# Patient Record
(demographics unavailable — no encounter records)

---
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
## 2024-10-14 NOTE — PHYSICAL EXAM
[1st] : 1st [Distal Phalanx] : distal phalanx [NL (75)] : Dorsiflexion 75 degrees [NL (85)] : volarflexion 85 degrees [5___] : volarflexion 5[unfilled]/5 [] : light touch intact throughout [Right] : right fingers [Fracture] : Fracture [FreeTextEntry9] : mojgan fx distal phalanx

## 2024-10-14 NOTE — ASSESSMENT
[FreeTextEntry1] : Xrays reviewed with patient and patient's mother Treatment options discussed  Finger splint applied today - medically necessary for stability otc anti-inflammatories / tylenol as needed Ice No phys ed / sports Follow up with hand specialist in 1 week

## 2024-10-21 NOTE — IMAGING
[de-identified] : right thumb with mild ttp over the distal phalanx no deformity all digits are nvi with farom ipsilateral wrist with full and pain free ROM / no ttp  Right thumb 3 view xray performed 10/21/2024 showed: no definitive bony pathology

## 2024-10-21 NOTE — ASSESSMENT
[FreeTextEntry1] : The patient was advised of the diagnosis. The natural history of the pathology was explained in full to the patient in layman's terms. All questions were answered. The risks and benefits of surgical and non-surgical treatment alternatives were explained in full to the patient.  Pt provided STACK splint x 2 more weeks for final xray and examination Pt allowed to play sports with splint on. Pt will rto in 2-3 weeks for xray and examination if pain has not resolved.
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
